# Patient Record
Sex: FEMALE | Race: WHITE | NOT HISPANIC OR LATINO | URBAN - NONMETROPOLITAN AREA
[De-identification: names, ages, dates, MRNs, and addresses within clinical notes are randomized per-mention and may not be internally consistent; named-entity substitution may affect disease eponyms.]

---

## 2019-02-18 ENCOUNTER — IMPORTED ENCOUNTER (OUTPATIENT)
Dept: URBAN - NONMETROPOLITAN AREA CLINIC 1 | Facility: CLINIC | Age: 54
End: 2019-02-18

## 2019-02-18 PROBLEM — H52.4: Noted: 2018-01-19

## 2019-02-18 PROBLEM — H11.051: Noted: 2019-02-18

## 2019-02-18 PROCEDURE — S0621 ROUTINE OPHTHALMOLOGICAL EXA: HCPCS

## 2019-02-18 NOTE — PATIENT DISCUSSION
Presbyopia OUDiscussed refractive status in detail with patientNew glasses Rx given today but do not recommend updating if she wishes to proceed with pterygium excision. Pterygium ODDiscussed diagnosis in detail with patient. Recommend refer to Dr. Yana Brown for evaluation and treatment. Patient to consider and will call to schedule. Continue to monitor.; 's Notes: MR 2/18/19DFE 2/18/19

## 2019-04-17 NOTE — PATIENT DISCUSSION
(H00.14) Chalazion left upper eyelid - Assesment : Examination revealed Chalazion. - Plan : Start Maxitrol oint TID OS upperlid and lash line Hot compresses BID-TID daily  Advised patient that if she is not noticing a improvement of symptoms she can schedule a minor to have I&D Patient to call if symptoms worsen or notice changes in vision  Patient provided with educational handout.

## 2022-04-09 ASSESSMENT — VISUAL ACUITY
OS_SC: 20/25-
OD_SC: 20/30-

## 2022-04-09 ASSESSMENT — TONOMETRY
OS_IOP_MMHG: 14
OD_IOP_MMHG: 14